# Patient Record
Sex: FEMALE | Race: WHITE | NOT HISPANIC OR LATINO | URBAN - METROPOLITAN AREA
[De-identification: names, ages, dates, MRNs, and addresses within clinical notes are randomized per-mention and may not be internally consistent; named-entity substitution may affect disease eponyms.]

---

## 2021-03-08 LAB
ABS NEUT (OLG): 3.55 X10(3)/MCL (ref 2.1–9.2)
ALBUMIN SERPL-MCNC: 4.4 GM/DL (ref 3.5–5)
ALBUMIN/GLOB SERPL: 1.5 RATIO (ref 1.1–2)
ALP SERPL-CCNC: 76 UNIT/L (ref 40–150)
ALT SERPL-CCNC: 12 UNIT/L (ref 0–55)
AST SERPL-CCNC: 15 UNIT/L (ref 5–34)
BASOPHILS # BLD AUTO: 0 X10(3)/MCL (ref 0–0.2)
BASOPHILS NFR BLD AUTO: 1 %
BILIRUB SERPL-MCNC: 0.6 MG/DL
BILIRUBIN DIRECT+TOT PNL SERPL-MCNC: 0.2 MG/DL (ref 0–0.5)
BILIRUBIN DIRECT+TOT PNL SERPL-MCNC: 0.4 MG/DL (ref 0–0.8)
BUN SERPL-MCNC: 7.5 MG/DL (ref 9.8–20.1)
CALCIUM SERPL-MCNC: 9.6 MG/DL (ref 8.4–10.2)
CHLORIDE SERPL-SCNC: 101 MMOL/L (ref 98–107)
CO2 SERPL-SCNC: 28 MMOL/L (ref 22–29)
CREAT SERPL-MCNC: 0.65 MG/DL (ref 0.55–1.02)
EOSINOPHIL # BLD AUTO: 0.1 X10(3)/MCL (ref 0–0.9)
EOSINOPHIL NFR BLD AUTO: 1 %
ERYTHROCYTE [DISTWIDTH] IN BLOOD BY AUTOMATED COUNT: 13.6 % (ref 11.5–17)
GLOBULIN SER-MCNC: 2.9 GM/DL (ref 2.4–3.5)
GLUCOSE SERPL-MCNC: 95 MG/DL (ref 74–100)
HCT VFR BLD AUTO: 43.2 % (ref 37–47)
HGB BLD-MCNC: 13.5 GM/DL (ref 12–16)
LYMPHOCYTES # BLD AUTO: 2.2 X10(3)/MCL (ref 0.6–4.6)
LYMPHOCYTES NFR BLD AUTO: 35 %
MCH RBC QN AUTO: 28.8 PG (ref 27–31)
MCHC RBC AUTO-ENTMCNC: 31.3 GM/DL (ref 33–36)
MCV RBC AUTO: 92.1 FL (ref 80–94)
MONOCYTES # BLD AUTO: 0.6 X10(3)/MCL (ref 0.1–1.3)
MONOCYTES NFR BLD AUTO: 9 %
NEUTROPHILS # BLD AUTO: 3.55 X10(3)/MCL (ref 2.1–9.2)
NEUTROPHILS NFR BLD AUTO: 54 %
PLATELET # BLD AUTO: 363 X10(3)/MCL (ref 130–400)
PMV BLD AUTO: 10.9 FL (ref 9.4–12.4)
POTASSIUM SERPL-SCNC: 4.7 MMOL/L (ref 3.5–5.1)
PROT SERPL-MCNC: 7.3 GM/DL (ref 6.4–8.3)
RBC # BLD AUTO: 4.69 X10(6)/MCL (ref 4.2–5.4)
SARS-COV-2 RNA RESP QL NAA+PROBE: NOT DETECTED
SODIUM SERPL-SCNC: 140 MMOL/L (ref 136–145)
WBC # SPEC AUTO: 6.5 X10(3)/MCL (ref 4.5–11.5)

## 2021-03-11 ENCOUNTER — HOSPITAL ENCOUNTER (OUTPATIENT)
Dept: ADMINISTRATIVE | Facility: HOSPITAL | Age: 56
End: 2021-03-12
Attending: SURGERY | Admitting: SURGERY

## 2021-03-12 LAB
ALBUMIN SERPL-MCNC: 3.8 GM/DL (ref 3.5–5)
ALBUMIN/GLOB SERPL: 1.4 RATIO (ref 1.1–2)
ALP SERPL-CCNC: 75 UNIT/L (ref 40–150)
ALT SERPL-CCNC: 12 UNIT/L (ref 0–55)
AST SERPL-CCNC: 15 UNIT/L (ref 5–34)
BILIRUB SERPL-MCNC: 0.4 MG/DL
BILIRUBIN DIRECT+TOT PNL SERPL-MCNC: 0.1 MG/DL (ref 0–0.5)
BILIRUBIN DIRECT+TOT PNL SERPL-MCNC: 0.3 MG/DL (ref 0–0.8)
BUN SERPL-MCNC: 8.5 MG/DL (ref 9.8–20.1)
CALCIUM SERPL-MCNC: 9.5 MG/DL (ref 8.4–10.2)
CHLORIDE SERPL-SCNC: 102 MMOL/L (ref 98–107)
CO2 SERPL-SCNC: 30 MMOL/L (ref 22–29)
CREAT SERPL-MCNC: 0.74 MG/DL (ref 0.55–1.02)
GLOBULIN SER-MCNC: 2.7 GM/DL (ref 2.4–3.5)
GLUCOSE SERPL-MCNC: 114 MG/DL (ref 74–100)
MAGNESIUM SERPL-MCNC: 2.1 MG/DL (ref 1.6–2.6)
MAGNESIUM SERPL-MCNC: 2.2 MG/DL (ref 1.6–2.6)
POTASSIUM SERPL-SCNC: 4.5 MMOL/L (ref 3.5–5.1)
PROT SERPL-MCNC: 6.5 GM/DL (ref 6.4–8.3)
SODIUM SERPL-SCNC: 140 MMOL/L (ref 136–145)
TSH SERPL-ACNC: 0.36 UIU/ML (ref 0.35–4.94)

## 2021-04-15 ENCOUNTER — HISTORICAL (OUTPATIENT)
Dept: LAB | Facility: HOSPITAL | Age: 56
End: 2021-04-15

## 2021-04-15 LAB
CHOLEST SERPL-MCNC: 199 MG/DL
CHOLEST/HDLC SERPL: 3 {RATIO} (ref 0–5)
FT4I SERPL CALC-MCNC: 2.69 (ref 2.6–3.6)
HDLC SERPL-MCNC: 59 MG/DL (ref 40–60)
LDLC SERPL CALC-MCNC: 114 MG/DL (ref 50–140)
T3FREE SERPL-MCNC: 3.15 PG/ML (ref 1.58–3.91)
T3RU NFR SERPL: 31.57 % (ref 31–39)
T4 FREE SERPL-MCNC: 1.03 NG/DL (ref 0.7–1.48)
T4 SERPL-MCNC: 8.51 UG/DL (ref 4.87–11.72)
TRIGL SERPL-MCNC: 128 MG/DL (ref 0–150)
TSH SERPL-ACNC: 1.6 UIU/ML (ref 0.35–4.94)
VLDLC SERPL CALC-MCNC: 26 MG/DL

## 2022-04-29 NOTE — H&P
Patient:   Nunu Gloria             MRN: [id number]            FIN: [id number]               Age:   55 years     Sex:  Female     :  1965   Associated Diagnoses:   None   Author:   Emily Cordova      Health Status   The H&P was reviewed, the patient was examined, and there are no changes to the patient's condition..

## 2022-04-29 NOTE — OP NOTE
Anish Austin MD      Patient:   Nunu Gloria             MRN: [id number]            FIN: [id number]               Age:   55 years     Sex:  Female     :  1965   Associated Diagnoses:   None   Author:   Anish Austin MD      Surgeon: Dr. Anish Austin MD    Assitant: SHER Smart    Preoperative Diagnosis:  Cholelithiasis    Postoperative diagnosis:  Same    Procedure: Laparoscopic cholecystectomy    Anesthesia:  GETA    EBL: 20cc    Intraoperative findings:  Chronic inflammation and distention of the gallbladder    Procedure in Detail:  After informed consent was obtained the patient was brought to the operating room placed in the supine position.  General endotracheal anesthesia was administered without difficulty.  The patient's abdomen was prepped and draped in sterile fashion.  After infiltration with local anesthesia, an umbilical incision was made and a 5 mm optical trocar was used to enter the peritoneal cavity under direct vision.  Pneumoperitoneum was achieved without difficulty.  Additional 5 mm ports were placed in the epigastrium in the right upper quadrant under direct laparoscopic vision.  The patient was placed in steep reverse Trendelenburg position and tilted towards the left.  The gallbladder was identified in the right upper quadrant it showed signs of chronic inflammation and distention.  The fundus was retracted cephalad.  The infundibulum was retracted laterally.  The cystic duct was identified and dissected circumferentially using gentle blunt dissection at its confluence with the gallbladder.  The cystic artery was similarly dissected circumferentially at its entrance into the gallbladder.  Once the anatomy was clearly defined, the cystic duct was divided between clips with 2 clips placed on the stay in side.  The cystic artery was divided between clips.  The gallbladder was excised from the gallbladder fossa using hook electrocautery.  Meticulous hemostasis was  achieved.  The gallbladder was decompressed and placed in an Endo Catch bag.  It was removed via the umbilical port site.  The right upper quadrant was irrigated, the irrigant was suctioned out.  The area was again inspected for hemostasis which was excellent, and bile leak and none was seen.  The umbilical port site fascia was closed with a 0 Vicryl stitch.  Ports were removed, pneumoperitoneum was relieved.  Port sites were closed with absorbable suture and sterile dressings were applied.  The patient was extubated in the operating room and brought to recovery room in stable condition.

## 2024-03-19 ENCOUNTER — HOSPITAL ENCOUNTER (OUTPATIENT)
Facility: HOSPITAL | Age: 59
Discharge: HOME | End: 2024-03-19
Attending: INTERNAL MEDICINE | Admitting: INTERNAL MEDICINE
Payer: COMMERCIAL

## 2024-03-19 VITALS
OXYGEN SATURATION: 99 % | HEART RATE: 50 BPM | HEIGHT: 64 IN | BODY MASS INDEX: 25.14 KG/M2 | SYSTOLIC BLOOD PRESSURE: 127 MMHG | RESPIRATION RATE: 17 BRPM | WEIGHT: 147.25 LBS | DIASTOLIC BLOOD PRESSURE: 64 MMHG | TEMPERATURE: 98 F

## 2024-03-19 DIAGNOSIS — R07.9 CHEST PAIN: ICD-10-CM

## 2024-03-19 DIAGNOSIS — R07.9 CHEST PAIN, UNSPECIFIED: ICD-10-CM

## 2024-03-19 PROCEDURE — 99152 MOD SED SAME PHYS/QHP 5/>YRS: CPT | Performed by: INTERNAL MEDICINE

## 2024-03-19 PROCEDURE — 25000003 PHARM REV CODE 250: Performed by: INTERNAL MEDICINE

## 2024-03-19 PROCEDURE — C1769 GUIDE WIRE: HCPCS | Performed by: INTERNAL MEDICINE

## 2024-03-19 PROCEDURE — 27201423 OPTIME MED/SURG SUP & DEVICES STERILE SUPPLY: Performed by: INTERNAL MEDICINE

## 2024-03-19 PROCEDURE — 93458 L HRT ARTERY/VENTRICLE ANGIO: CPT | Performed by: INTERNAL MEDICINE

## 2024-03-19 PROCEDURE — 63600175 PHARM REV CODE 636 W HCPCS: Performed by: INTERNAL MEDICINE

## 2024-03-19 PROCEDURE — C1887 CATHETER, GUIDING: HCPCS | Performed by: INTERNAL MEDICINE

## 2024-03-19 PROCEDURE — C1894 INTRO/SHEATH, NON-LASER: HCPCS | Performed by: INTERNAL MEDICINE

## 2024-03-19 RX ORDER — HYDROCODONE BITARTRATE AND ACETAMINOPHEN 5; 325 MG/1; MG/1
1 TABLET ORAL EVERY 4 HOURS PRN
Status: DISCONTINUED | OUTPATIENT
Start: 2024-03-19 | End: 2024-03-19 | Stop reason: HOSPADM

## 2024-03-19 RX ORDER — LANOLIN ALCOHOL/MO/W.PET/CERES
400 CREAM (GRAM) TOPICAL DAILY
Status: ON HOLD | COMMUNITY
End: 2024-03-19

## 2024-03-19 RX ORDER — HYDRALAZINE HYDROCHLORIDE 20 MG/ML
10 INJECTION INTRAMUSCULAR; INTRAVENOUS EVERY 4 HOURS PRN
Status: DISCONTINUED | OUTPATIENT
Start: 2024-03-19 | End: 2024-03-19 | Stop reason: HOSPADM

## 2024-03-19 RX ORDER — NITROGLYCERIN 20 MG/100ML
INJECTION INTRAVENOUS
Status: DISCONTINUED | OUTPATIENT
Start: 2024-03-19 | End: 2024-03-19 | Stop reason: HOSPADM

## 2024-03-19 RX ORDER — LIDOCAINE HYDROCHLORIDE 10 MG/ML
INJECTION INFILTRATION; PERINEURAL
Status: DISCONTINUED | OUTPATIENT
Start: 2024-03-19 | End: 2024-03-19 | Stop reason: HOSPADM

## 2024-03-19 RX ORDER — METOPROLOL SUCCINATE 25 MG/1
25 TABLET, EXTENDED RELEASE ORAL NIGHTLY
COMMUNITY

## 2024-03-19 RX ORDER — FENTANYL CITRATE 50 UG/ML
INJECTION, SOLUTION INTRAMUSCULAR; INTRAVENOUS
Status: DISCONTINUED | OUTPATIENT
Start: 2024-03-19 | End: 2024-03-19 | Stop reason: HOSPADM

## 2024-03-19 RX ORDER — CHOLECALCIFEROL (VITAMIN D3) 25 MCG
1000 TABLET ORAL NIGHTLY
COMMUNITY

## 2024-03-19 RX ORDER — MIDAZOLAM HYDROCHLORIDE 1 MG/ML
INJECTION INTRAMUSCULAR; INTRAVENOUS
Status: DISCONTINUED | OUTPATIENT
Start: 2024-03-19 | End: 2024-03-19 | Stop reason: HOSPADM

## 2024-03-19 RX ORDER — ESCITALOPRAM OXALATE 10 MG/1
10 TABLET ORAL NIGHTLY
COMMUNITY

## 2024-03-19 RX ORDER — ASPIRIN 325 MG
325 TABLET ORAL NIGHTLY
COMMUNITY

## 2024-03-19 RX ORDER — ONDANSETRON HYDROCHLORIDE 2 MG/ML
4 INJECTION, SOLUTION INTRAVENOUS EVERY 8 HOURS PRN
Status: DISCONTINUED | OUTPATIENT
Start: 2024-03-19 | End: 2024-03-19 | Stop reason: HOSPADM

## 2024-03-19 RX ORDER — ASPIRIN 325 MG
100 TABLET, DELAYED RELEASE (ENTERIC COATED) ORAL NIGHTLY
COMMUNITY

## 2024-03-19 RX ORDER — ALPRAZOLAM 0.25 MG/1
0.25 TABLET ORAL 2 TIMES DAILY PRN
COMMUNITY

## 2024-03-19 RX ORDER — DIPHENHYDRAMINE HCL 25 MG
50 CAPSULE ORAL
Status: DISCONTINUED | OUTPATIENT
Start: 2024-03-19 | End: 2024-03-19 | Stop reason: HOSPADM

## 2024-03-19 RX ORDER — SODIUM CHLORIDE 9 MG/ML
INJECTION, SOLUTION INTRAVENOUS ONCE
Status: DISCONTINUED | OUTPATIENT
Start: 2024-03-19 | End: 2024-03-19 | Stop reason: HOSPADM

## 2024-03-19 RX ORDER — PANTOPRAZOLE SODIUM 40 MG/1
40 TABLET, DELAYED RELEASE ORAL NIGHTLY
COMMUNITY

## 2024-03-19 RX ORDER — ZINC GLUCONATE 50 MG
100 TABLET ORAL NIGHTLY
COMMUNITY

## 2024-03-19 RX ORDER — MORPHINE SULFATE 4 MG/ML
2 INJECTION, SOLUTION INTRAMUSCULAR; INTRAVENOUS EVERY 4 HOURS PRN
Status: DISCONTINUED | OUTPATIENT
Start: 2024-03-19 | End: 2024-03-19 | Stop reason: HOSPADM

## 2024-03-19 RX ORDER — VERAPAMIL HYDROCHLORIDE 2.5 MG/ML
INJECTION, SOLUTION INTRAVENOUS
Status: DISCONTINUED | OUTPATIENT
Start: 2024-03-19 | End: 2024-03-19 | Stop reason: HOSPADM

## 2024-03-19 RX ORDER — RANOLAZINE 500 MG/1
500 TABLET, EXTENDED RELEASE ORAL 2 TIMES DAILY
COMMUNITY

## 2024-03-19 RX ORDER — CLOPIDOGREL BISULFATE 75 MG/1
75 TABLET ORAL NIGHTLY
Status: ON HOLD | COMMUNITY
End: 2024-03-19 | Stop reason: HOSPADM

## 2024-03-19 RX ORDER — HEPARIN SODIUM 1000 [USP'U]/ML
INJECTION, SOLUTION INTRAVENOUS; SUBCUTANEOUS
Status: DISCONTINUED | OUTPATIENT
Start: 2024-03-19 | End: 2024-03-19 | Stop reason: HOSPADM

## 2024-03-19 RX ORDER — ACETAMINOPHEN 325 MG/1
650 TABLET ORAL EVERY 4 HOURS PRN
Status: DISCONTINUED | OUTPATIENT
Start: 2024-03-19 | End: 2024-03-19 | Stop reason: HOSPADM

## 2024-03-19 RX ORDER — DIAZEPAM 5 MG/1
10 TABLET ORAL
Status: DISCONTINUED | OUTPATIENT
Start: 2024-03-19 | End: 2024-03-19 | Stop reason: HOSPADM

## 2024-03-19 RX ORDER — SODIUM CHLORIDE 9 MG/ML
INJECTION, SOLUTION INTRAVENOUS CONTINUOUS
Status: DISCONTINUED | OUTPATIENT
Start: 2024-03-19 | End: 2024-03-19 | Stop reason: HOSPADM

## 2024-03-19 RX ORDER — SUCRALFATE 1 G/1
1 TABLET ORAL DAILY PRN
COMMUNITY

## 2024-03-19 RX ORDER — ROSUVASTATIN CALCIUM 40 MG/1
40 TABLET, COATED ORAL NIGHTLY
COMMUNITY

## 2024-03-19 RX ADMIN — DIAZEPAM 10 MG: 5 TABLET ORAL at 05:03

## 2024-03-19 RX ADMIN — DIPHENHYDRAMINE HYDROCHLORIDE 50 MG: 25 CAPSULE ORAL at 05:03

## 2024-03-19 NOTE — Clinical Note
The catheter was inserted into the ostium   left anterior descending  ostium   right coronary artery. An angiography was performed of the left coronary arteries.

## 2024-03-19 NOTE — INTERVAL H&P NOTE
Patient name: Nunu Gloria  MRN: [id number]  : 1965  Cath Lab Procedure H&P Update    Pre-Procedure Assessment:    I saw and examined the patient face to face. The patient has been re-evaluated and her condition is unchanged. The reason for admission, procedure and care is still present.  Based on the patients H&P, pre-procedure physical exam, relevant diagnostic studies, NPO status and information obtained from the patient, I determined the patient is an appropriate candidate for the proposed procedure and anesthesia planned. I further certify the anesthesia risks, benefits and options have been explained to the patient to which she agrees as documented on the procedural consent.

## (undated) DEVICE — Device

## (undated) DEVICE — GUIDEWIRE OMNI STR TIP 185CM

## (undated) DEVICE — DRAPE RADIAL BRACHIAL 29X42IN

## (undated) DEVICE — CATH OPTITORQUE TIG 4.0 100 CM

## (undated) DEVICE — CANNULA NASAL ADULT

## (undated) DEVICE — SET ANGIO ACIST CVI ANGIOTOUCH

## (undated) DEVICE — VALVE GUARDIAN II HEMOSTASIS

## (undated) DEVICE — SYS WASTE FLD DISPOSAL 1400ML

## (undated) DEVICE — GUIDEWIRE INQWIRE SE 3MM JTIP

## (undated) DEVICE — PAD DEFIB CADENCE ADULT R2

## (undated) DEVICE — INTRODUCER TRNSRADIAL 6F 10CM

## (undated) DEVICE — BAND TR COMP DEVICE REG 24CM

## (undated) DEVICE — CONTRAST ISOVUE 300 50ML

## (undated) DEVICE — GUIDE LAUNCHER 6FR EBU 3.5